# Patient Record
Sex: FEMALE | Race: WHITE
[De-identification: names, ages, dates, MRNs, and addresses within clinical notes are randomized per-mention and may not be internally consistent; named-entity substitution may affect disease eponyms.]

---

## 2018-02-25 ENCOUNTER — HOSPITAL ENCOUNTER (EMERGENCY)
Dept: HOSPITAL 25 - UCEAST | Age: 58
Discharge: HOME | End: 2018-02-25
Payer: COMMERCIAL

## 2018-02-25 VITALS — DIASTOLIC BLOOD PRESSURE: 82 MMHG | SYSTOLIC BLOOD PRESSURE: 116 MMHG

## 2018-02-25 DIAGNOSIS — R03.0: ICD-10-CM

## 2018-02-25 DIAGNOSIS — M79.662: Primary | ICD-10-CM

## 2018-02-25 DIAGNOSIS — Z88.5: ICD-10-CM

## 2018-02-25 DIAGNOSIS — Z88.0: ICD-10-CM

## 2018-02-25 DIAGNOSIS — M21.372: ICD-10-CM

## 2018-02-25 PROCEDURE — G0463 HOSPITAL OUTPT CLINIC VISIT: HCPCS

## 2018-02-25 PROCEDURE — 99211 OFF/OP EST MAY X REQ PHY/QHP: CPT

## 2018-02-25 NOTE — RAD
Indication: Left lower leg pain.



2 views of left lower leg demonstrates no fracture. No other bone or joint abnormality is

identified.



IMPRESSION: No definite fracture of the left lower leg is noted.

## 2018-02-25 NOTE — UC
May LOPEZ Jason, scribed for Chaka Plascencia MD on 02/25/18 at 1226 .





Lower Extremity/Ankle HPI





- HPI Summary


HPI Summary: 


This patient is a 57 year old F presenting to [facility] with a chief complaint of 

stabbing and throbbing lower left leg pain since 2.5 weeks ago. The patient 

states the leg pain got to the point where I cant run. It has interrupted my 

sleep for the last 3 days. Prior to the injury I was running 3 days a week 

because I got a hip injury in December. My hip got better, I received had a 

cortisone injection, and I began running 20 miles a week. Additionally, the 

patient states she couldnt even stand this morning and is taking ibuprofen for 

pain. The patient rates the pain 5/10 in severity. Symptoms aggravated by 

nothing. Symptoms alleviated by nothing. Patient reports having a foot drop. 

Patient denies weakness, numbness, and butt pain. Has hx of stress fracture.








- History of Current Complaint


Chief Complaint: UCLowerExtremity


Stated Complaint: LEG PAIN


Time Seen by Provider: 02/25/18 12:02


Hx Obtained From: Patient


Onset/Duration: Lasting Weeks - Since 2.5 weeks ago, Still Present


Pain Intensity: 5


Pain Scale Used: 0-10 Numeric


Aggravating Factor(s): Nothing


Alleviating Factor(s): Nothing





- Allergies/Home Medications


Allergies/Adverse Reactions: 


 Allergies











Allergy/AdvReac Type Severity Reaction Status Date / Time


 


codeine Allergy  Nausea Verified 02/25/18 10:50


 


Penicillins Allergy  Hives Verified 02/25/18 10:50











Home Medications: 


 Home Medications





Ibuprofen 400 mg PO 02/25/18 [History]











PMH/Surg Hx/FS Hx/Imm Hx





- Surgical History


Surgical History: None


Surgery Procedure, Year, and Place: AGE 12 MOUTH SURGERY-NO METAL.  AGE 38 

BUNIONECTOMY.  RIGHT BREAST BIOPSY





- Family History


Known Family History: Positive: Other - Breast CA





- Social History


Alcohol Use: Weekly


Alcohol Amount: 2-3 beers 1-3x a week


Substance Use Type: None


Smoking Status (MU): Never Smoked Tobacco





Review of Systems


Constitutional: Other - negative fever


Musculoskeletal: Other: - experienced a "foot drop" while running. Lower left 

leg pain.


All Other Systems Reviewed And Are Negative: Yes





Physical Exam





- Summary


Physical Exam Summary: 





General: well-appearing, no pain distress


Skin: warm, color reflects adequate perfusion, dry


Head: normal


Eyes: EOMI, SAMANTHA


ENT: normal


Neck: supple, nontender


Respiratory: CTA, breath sounds present


Cardiovascular: RRR


Abdomen: soft, nontender


Bowel: present


Musculoskeletal: No low back pain. No tenderness of sciatic nerve distribution. 

The knee is normal. Ankle is normal. Plantar flexion is normal. Left foot 

Dorsiflexion is mildly decreased when compared to the right foot.


Neurological: normal, sensory/motor intact, A&O x3


Psychological: affect/mood appropriate





Triage Information Reviewed: Yes


Vital Signs: 


 Initial Vital Signs











Temp  98.4 F   02/25/18 10:42


 


Pulse  65   02/25/18 10:42


 


Resp  18   02/25/18 10:42


 


BP  116/82   02/25/18 10:42


 


Pulse Ox  100   02/25/18 10:42














Diagnostics





- Radiology


  ** lower extremity xray


Radiology Interpretation Completed By: Radiologist - Lower extremity x-ray 

reveals, per radiologist, no definite fracture of the left lower leg.  

physician has reviewed this radiology report.





Lower Extremity Course/Dx





- Course


Course Of Treatment: Lower extremity x-ray reveals, per radiologist, No 

definite fracture of the left lower leg is noted.  THERE IS MILD DECREASED 

STRENGTH OF LEFT FOOT DORSIFLEXION ON EXAM. PATIENT REPORTS IT WAS WORSE 2 

WEEKS AGO WHERE SHE HAD DIFFICULTY WITH DORSIFLEXION.  THERE IS NO LOW BACK OR 

HIP/SCIATIC PAIN/TENDERNESS.  THE PAIN IN THE LOWER LEG MOST PROBABLY REPRESENT 

PERONEAL NERVE IMPINGEMENT FROM SOFT TISSUE INFLAMMATION.  DISCUSSED X-RAY 

RESULTS AND FOOT DROP.  TREAT WITH REST/ICE NSAIDS AND CLOSE F/U WITH SPORTS 

MEDICINE.





- Differential Dx/Diagnosis


Provider Diagnoses: LEFT LOWER LEG PAIN.  LEFT FOOT DROP.  Elevated blood 

pressure without a diagnosis of hypertension





Discharge





- Discharge Plan


Condition: Stable


Disposition: HOME


Patient Education Materials:  Foot Drop (ED)


Referrals: 


Eddie Szymanski [Medical Doctor] - 


Nazia Posada MD [Primary Care Provider] - 


Additional Instructions: 


FOLLOW UP WITH YOUR SPORTS MEDICINE DOCTOR.


CALL TOMORROW TO ARRANGE FOLLOW UP.


GET RECHECKED FOR ANY WORSENING OF YOUR CONDITION OR QUESTIONS OR CONCERNS.


YOUR BLOOD PRESSURE WAS ELEVATED DURING TODAY'S VISIT; FOLLOW UP WITH YOUR PCP 

WITHIN ONE WEEK FOR FURTHER EVALUATION.





The documentation as recorded by the May rodríguez Jason accurately 

reflects the service I personally performed and the decisions made by me, 

Chaka Plascencia MD.

## 2018-08-23 ENCOUNTER — HOSPITAL ENCOUNTER (EMERGENCY)
Dept: HOSPITAL 25 - ED | Age: 58
Discharge: ANOTHER HOSPITAL | End: 2018-08-23
Payer: COMMERCIAL

## 2018-08-23 VITALS — DIASTOLIC BLOOD PRESSURE: 65 MMHG | SYSTOLIC BLOOD PRESSURE: 98 MMHG

## 2018-08-23 DIAGNOSIS — Z88.5: ICD-10-CM

## 2018-08-23 DIAGNOSIS — B96.89: ICD-10-CM

## 2018-08-23 DIAGNOSIS — Z88.0: ICD-10-CM

## 2018-08-23 DIAGNOSIS — M46.46: ICD-10-CM

## 2018-08-23 DIAGNOSIS — L02.212: Primary | ICD-10-CM

## 2018-08-23 DIAGNOSIS — Z98.890: ICD-10-CM

## 2018-08-23 LAB
BASOPHILS # BLD AUTO: 0.1 10^3/UL (ref 0–0.2)
EOSINOPHIL # BLD AUTO: 0 10^3/UL (ref 0–0.6)
HCT VFR BLD AUTO: 32 % (ref 35–47)
HGB BLD-MCNC: 11.2 G/DL (ref 12–16)
INR PPP/BLD: 1.48 (ref 0.77–1.02)
LYMPHOCYTES # BLD AUTO: 3.2 10^3/UL (ref 1–4.8)
MCH RBC QN AUTO: 30 PG (ref 27–31)
MCHC RBC AUTO-ENTMCNC: 35 G/DL (ref 31–36)
MCV RBC AUTO: 86 FL (ref 80–97)
MONOCYTES # BLD AUTO: 0.5 10^3/UL (ref 0–0.8)
NEUTROPHILS # BLD AUTO: 15 10^3/UL (ref 1.5–7.7)
NRBC # BLD AUTO: 0 10^3/UL
NRBC BLD QL AUTO: 0.1
PLATELET # BLD AUTO: 490 10^3/UL (ref 150–450)
RBC # BLD AUTO: 3.78 10^6/UL (ref 4–5.4)
WBC # BLD AUTO: 18.9 10^3/UL (ref 3.5–10.8)

## 2018-08-23 PROCEDURE — 87205 SMEAR GRAM STAIN: CPT

## 2018-08-23 PROCEDURE — 86140 C-REACTIVE PROTEIN: CPT

## 2018-08-23 PROCEDURE — 72158 MRI LUMBAR SPINE W/O & W/DYE: CPT

## 2018-08-23 PROCEDURE — 36415 COLL VENOUS BLD VENIPUNCTURE: CPT

## 2018-08-23 PROCEDURE — 85025 COMPLETE CBC W/AUTO DIFF WBC: CPT

## 2018-08-23 PROCEDURE — 80053 COMPREHEN METABOLIC PANEL: CPT

## 2018-08-23 PROCEDURE — A9579 GAD-BASE MR CONTRAST NOS,1ML: HCPCS

## 2018-08-23 PROCEDURE — 96375 TX/PRO/DX INJ NEW DRUG ADDON: CPT

## 2018-08-23 PROCEDURE — 99284 EMERGENCY DEPT VISIT MOD MDM: CPT

## 2018-08-23 PROCEDURE — 85730 THROMBOPLASTIN TIME PARTIAL: CPT

## 2018-08-23 PROCEDURE — 96366 THER/PROPH/DIAG IV INF ADDON: CPT

## 2018-08-23 PROCEDURE — 87186 SC STD MICRODIL/AGAR DIL: CPT

## 2018-08-23 PROCEDURE — 87040 BLOOD CULTURE FOR BACTERIA: CPT

## 2018-08-23 PROCEDURE — 87077 CULTURE AEROBIC IDENTIFY: CPT

## 2018-08-23 PROCEDURE — 96376 TX/PRO/DX INJ SAME DRUG ADON: CPT

## 2018-08-23 PROCEDURE — 85610 PROTHROMBIN TIME: CPT

## 2018-08-23 PROCEDURE — 96365 THER/PROPH/DIAG IV INF INIT: CPT

## 2018-08-23 NOTE — ED
Progress





- Progress Note


Progress Note: 


This patient is a 58 year old F presenting to [facility] with a chief complaint of 

cramping right leg pain since 8/16/18, with sx at their worst since 8/19/18. 

She endorses lumbar spine laminectomy on 7/13/18 with Dr. Marin in [place]. 

She states that 10 days s/p surgery the incision site opened and became infected

; she finished her abx 8/16/18. Pt endorses that sx were nearly gone last week, 

but then returned and steadily worsened starting on 8/16/18. By 8/19/18 pt 

denies being able to ambulate, even with a crutch. Sx worsen with turning funny

; goes to 10/10 pain. Rx oxycodone and flexural, which stop alleviating sx 

after less than 1 hour. She endorses that the pain started out in her buttock, 

but is now located lower in the leg now. She denies current pain as long as she

s not moving. She endorses decreased ROM secondary to pain, right sided leg 

weakness, and partial numbness in her foot. Pt notes sleeping in a recliner 

chair, and endorses possibly laying on symptomatic side more frequently. Dr. Rosa planning on MRI Friday in [place].





Physical Exam:


General: well-appearing, no pain distress


Skin: warm, color reflects adequate perfusion, dry. 4 cm incision over lower 

lumbar spine with dressing in it, with a small amount of yellow discharge on 

the dressing itself. No erythema, no swelling


Head: normal


Eyes: EOMI, SAMANTHA


ENT: normal


Neck: supple, nontender


Respiratory: CTA, breath sounds present


Cardiovascular: RRR


Abdomen: soft, nontender


Bowel: present


Musculoskeletal: Any right leg movement causes lower back pain. Patellar 

reflexes intact, normal pulses, plantar and dorsal flexion 5/5 bilaterally, 

though pt reports right dorsal flexion is weaker than the left. Lowered 

sensation of medial aspect of right foot, normal on lateral aspect.


Neurological: sensory/motor intact, A&O x3


Psychological: affect/mood appropriate





- Results/Orders


Results/Orders: 


MRI Lumbar Spine:


1.  TRANSITIONAL ANATOMY, USING THE COUNTING SCHEME AS DESCRIBED ABOVE.


2.  STATUS POST LEFT HEMILAMINECTOMY AT L3-L4.


3.  THERE HAS BEEN INTERVAL DEVELOPMENT OF BONE EDEMA AND ENHANCEMENT OF L4-L5 

WITH


IRREGULAR ENDPLATES AND FLUID WITHIN THE INTERVERTEBRAL DISC SPACE OF L4-L5 

CONSISTENT


WITH OSTEOMYELITIS DISCITIS. THERE IS ASSOCIATED PARAVERTEBRAL INFLAMMATORY 

CHANGE


4.  THERE IS A PERIPHERALLY ENHANCING EPIDURAL FLUID COLLECTION AT L4 EXTENDING 

TO THE


RIGHT LATERAL RECESS OF L4-L5 MOST CONSISTENT WITH EPIDURAL ABSCESS GIVEN THE 

IMAGING


FINDINGS.


5.  THERE IS SEVERE NARROWING OF THE CENTRAL CANAL AT L4-L5. THERE IS 

NEUROFORAMINAL


NARROWING AS DESCRIBED ABOVE. 


Dr. Plascencia has reviewed this report.








Re-Evaluation





- Re-Evaluation


  ** First Eval


Re-Evaluation Time: 14:14


Change: Unchanged


Comment: Informed of transfer





Course/Dx





- Course


Course Of Treatment: DISCUSSED WITH THE PATIENT'S NEUROSURGEON, DR MARIN [phone].  HE RECOMMENDED TRANSFER TO [facility] WHERE HE HAS ADMITTING 

PRIVLEDGES.  STABLE AT TRANSFER.





- Diagnoses


Provider Diagnoses: 


 Back pain, Abscess in epidural space of lumbar spine, Lumbar discitis








- Provider Notifications


Discussed Care Of Patient With: Kamran Marin MD


Time Discussed With Above Provider: 08:35


Instructed by Provider To: Other - Phone number [phone]. Recommends MRI of 

L-S spine.





Discharge





- Sign-Out/Discharge


Documenting (check all that apply): Patient Departure - transfer, Receiving Sign

-Out


Receiving patient FROM: [facility]





- Discharge Plan


Condition: Stable


Disposition: TRANS HIGHER LVL OF CARE FAC


Referrals: 


Nazia Posada MD [Primary Care Provider] - 





- Billing Disposition and Condition


Condition: STABLE


Disposition: Trans Higher Lvl of Care Fac





- Attestation Statements


Document Initiated by Imeldaibe: Yes


Documenting Scribe: Eriberto Rodriguez


Provider For Whom Scribe is Documenting (Include Credential): Dr. Chaka Plascencia MD


Scribe Attestation: 


Eriberto LOPEZ scribed for Dr. Chaka Plascencia MD on 08/23/18 at 1523. 


Scribe Documentation Reviewed: Yes


Provider Attestation: 


The documentation as recorded by the Eriberto rodríguez accurately 

reflects the service I personally performed and the decisions made by me, Dr. Chaka Plascencia MD

## 2018-08-23 NOTE — RAD
HISTORY: low back/rt leg pain,lumbar laminectomy 6 wk ago



COMPARISONS: February 27, 2018 



TECHNIQUE: The following sequences were obtained of the lumbar spine: Sagittal and axial

T1- and T2-weighted images, coronal T2-weighted images, and sagittal STIR images.

Additionally, axial and sagittal T1 weighted images were obtained after contrast

enhancement with a gadolinium-based intravenous contrast agent..



FINDINGS: There is transitional anatomy with a last lumbar type vertebral body which will

be labeled L5 for the purposes of counting.



SPINAL CORD, CONUS, AND CAUDA EQUINA: The visualized spinal cord, conus, and cauda equina

are normal in caliber, position, and signal intensity. There is a peripherally enhancing

epidural fluid collection measuring 0.8 cm in depth centered at L4 extending to the right

lateral recess of L4-L5. 

ALIGNMENT: There is grade 1 anterolisthesis of L4-L5. 

VERTEBRAL BODIES: There is edema and the patient is status post laminectomy. Enhancement

of the vertebral bodies of L4 and L5 with irregularity along the endplates.  There is

transitional anatomy with partial sacralization of the L5 vertebral body.

JOINTS: There is facet osteoarthritis along the lower lumbar spine. 

MUSCULATURE: There is edema and enhancement of the left multifidus muscle. 

INTERVERTEBRAL DISCS: There is intravertebral fluid at L4-L5. 



AXIAL IMAGES:



L2-L3: There is no disc herniation, spinal stenosis, or neuroforaminal narrowing.

L3-L4: A left-sided laminectomy defect is noted. There is moderate bilateral

neuroforaminal narrowing. 

L4-L5: There is a broad-based disc bulge/rolled disc. There is post surgical change to

left facet joint. There is ligamentous hypertrophy. There is moderate bilateral

neuroforaminal narrowing. There is severe narrowing of the central canal. 

L5-S1: There is bilateral facet hypertrophy. There is no significant neural foraminal

narrowing of central canal stenosis. 



SOFT TISSUES: There is post surgical change posterior to L4-L5 with fluid tracking to the

skin surface. There is perivertebral edema and enhancement centered at L4-L5. 



OTHER: None.



IMPRESSION:

1.  TRANSITIONAL ANATOMY, USING THE COUNTING SCHEME AS DESCRIBED ABOVE.

2.  STATUS POST LEFT HEMILAMINECTOMY AT L3-L4.

3.  THERE HAS BEEN INTERVAL DEVELOPMENT OF BONE EDEMA AND ENHANCEMENT OF L4-L5 WITH

IRREGULAR ENDPLATES AND FLUID WITHIN THE INTERVERTEBRAL DISC SPACE OF L4-L5 CONSISTENT

WITH OSTEOMYELITIS DISCITIS. THERE IS ASSOCIATED PARAVERTEBRAL INFLAMMATORY CHANGE

4.  THERE IS A PERIPHERALLY ENHANCING EPIDURAL FLUID COLLECTION AT L4 EXTENDING TO THE

RIGHT LATERAL RECESS OF L4-L5 MOST CONSISTENT WITH EPIDURAL ABSCESS GIVEN THE IMAGING

FINDINGS.

5.  THERE IS SEVERE NARROWING OF THE CENTRAL CANAL AT L4-L5. THERE IS NEUROFORAMINAL

NARROWING AS DESCRIBED ABOVE. 



PRELIMINARY FINDINGS WERE DISCUSSED WITH DR. BENSON AT APPROXIMATELY 12:23 PM ON AUGUST 23, 2018 .

## 2018-08-23 NOTE — ED
Lower Extremity





- HPI Summary


HPI Summary: 


Pt is a 58 year old female presenting to the ED with a chief complaint of leg 

pain. About 6 weeks ago, she had a laminectomy on her lumbar spine at [facility], but had complications when the sutures opened and the wound 

became infected. She was on an Abx course which she finished, but started 

having pain in her R leg on 8/18. The next day, the spasms started, but she 

could still walk to the bathroom. Currently, she cannot bear weight or move her 

R leg without any pain. It feels ok when she is resting, but when she tries to 

move, she describes it as a 10/10 pain. Pt denies fevers. 








- History of Current Complaint


Chief Complaint: EDAbdPain


Stated Complaint: LEG PAIN


Time Seen by Provider: 08/23/18 05:47


Hx Obtained From: Patient


Mechanism Of Injury: Other - surgery 6 wks ago


Onset of Pain: Days


Onset/Duration: Days


Severity Initially: Moderate


Severity Currently: Moderate


Pain Intensity: 10


Pain Scale Used: 0-10 Numeric - upon movement


Timing: Constant


Associated Signs And Symptoms: Negative: Fever


Aggravating Factor(s): Standing, Ambulation, Movement, Weight Bearing


Alleviating Factor(s): Rest





- Allergies/Home Medications


Allergies/Adverse Reactions: 


 Allergies











Allergy/AdvReac Type Severity Reaction Status Date / Time


 


codeine Allergy  Nausea Verified 05/04/18 10:05


 


Penicillins Allergy  Hives Verified 05/04/18 10:05














PMH/Surg Hx/FS Hx/Imm Hx


Previously Healthy: Yes


Endocrine/Hematology History: 


   Denies: Hx Diabetes


Cardiovascular History: 


   Denies: Hx Hypertension, Hx Pacemaker/ICD


 History: 


   Denies: Hx Renal Disease


Musculoskeletal History: 


   Denies: Hx Rheumatoid Arthritis, Hx Osteoporosis


Sensory History: 


   Denies: Hx Hearing Aid


Neurological History: 


   Denies: Other Neuro Impairments/Disorders - PAIN CLINIC PT


Psychiatric History: 


   Denies: Hx Panic Disorder





- Cancer History


Hx Chemotherapy: No


Hx Radiation Therapy: No





- Surgical History


Surgery Procedure, Year, and Place: AGE 12 MOUTH SURGERY-NO METAL.  AGE 38 

BUNIONECTOMY.  RIGHT BREAST BIOPSY


Infectious Disease History: 


   Denies: Traveled Outside the [place] in Last 30 Days





- Family History


Known Family History: Positive: Other - Breast CA





- Social History


Alcohol Use: Weekly


Alcohol Amount: 3-5 DRINKS


Substance Use Type: Reports: None


Hx Tobacco Use: No


Smoking Status (MU): Never Smoked Tobacco





Review of Systems





- ROS Summary


Review of Systems Summary: 


Appearance: Well-appearing, Well-nourished, lying in bed comfortable


Skin: Warm, dry, no obvious rash


Eyes: sclera anicteric, no conjunctival pallor


ENT: mucous membranes moist


Neck: deferred


Respiratory: No signs of respiratory distress


Cardiovascular: Appears well perfused, pulses are nml


Abdomen: deferred


Musculoskeletal: Good strength and ROM in both legs, reflexes are fine in L leg

, no reflexes on R leg. Wound on back from laminectomy has mild purulent 

drainage. The area has no swelling, tenderness, or erythema around the area. 


Neurological: Awake  and alert, mentation is normal, speech is fluent and 

appropriate


Psychiatric: affect is normal, does not appear anxious or depressed








Negative: Fever


Musculoskeletal: Other - spasms in R leg


Positive: Myalgia, Other - reflexes are fine in L leg, no reflexes on R leg. 

Wound on back from laminectomy has mild purulent drainage. The area has no 

swelling, tenderness, or erythema around the area. 


All Other Systems Reviewed And Are Negative: Yes





Physical Exam





- Summary


Physical Exam Summary: 


Appearance: Well-appearing, Well-nourished, lying in bed comfortable


Skin: Warm, dry, no obvious rash


Eyes: sclera anicteric, no conjunctival pallor


ENT: mucous membranes moist


Neck: deferred


Respiratory: No signs of respiratory distress


Cardiovascular: Appears well perfused, pulses are nml


Abdomen: deferred


Musculoskeletal: Good strength and ROM in both legs, reflexes are fine in L leg

, no reflexes on R leg. Wound on back from laminectomy has mild purulent 

drainage. The area has no swelling, tenderness, or erythema around the area. 


Neurological: Awake  and alert, mentation is normal, speech is fluent and 

appropriate


Psychiatric: affect is normal, does not appear anxious or depressed








Triage Information Reviewed: Yes


Vital Signs Reviewed: Yes





Lower Extremity Course/Dx





- Diagnoses


Provider Diagnoses: 


 Back pain








Discharge





- Sign-Out/Discharge


Documenting (check all that apply): Sign-Out Patient


Signing out patient TO: Chaka Plascencia


Receiving patient FROM: [facility]





- Discharge Plan


Referrals: 


Nazia Posada MD [Primary Care Provider] - 





- Attestation Statements


Document Initiated by Scribe: Yes


Documenting Scribe: Brigid Song


Provider For Whom Scribe is Documenting (Include Credential): [facility] MD.


Scribe Attestation: 


Brigid LOPEZ, scribed for [facility] MD. on 08/23/18 at 0641.

## 2018-08-23 NOTE — XMS REPORT
Coco Bonner

 Created on:2018



Patient:Coco Bonner

Sex:Female

:1960

External Reference #:[id number]





Demographics







 Address  [address] [place] [address]

 

 Home Phone  [phone]

 

 Work Phone  [phone]

 

 Email Address  [email or web address]

 

 Preferred Language  English

 

 Marital Status  Not  Or 

 

 Mandaeism Affiliation  Unknown

 

 Race  White

 

 Ethnic Group  Not  Or 









Author







 Organization  [facility]

 

 Address  [address]



   [place] [address]

 

 Phone  [phone]









Care Team Providers







 Name  Role  Phone

 

 Nazia Willoughby MD  Care Team Information   Unavailable









Payers







 Type  Date  Identification Numbers  Payment Provider  Subscriber

 

 Commercial    Policy Number: [id number]  Lifetime Benefit SLNS  Coco Bonner









 Group Number: [id number]  [address] [address]

 

 PayID: [facility]  [place] [id number]









 Commercial  Expires: 2014  Policy Number:  [facility] DO Not Use  Coco Bonner



     [id number]    









 PayID: [employer]  [address]









 [place] [address]







Problems







 Date  Description  Provider  Status

 

 Onset: 2015  Environmental allergy  Anamaria Davis MD  Active







Family History







 Date  Family Member(s)  Problem(s)  Comments

 

   Father   due to Hodgkin  ()



     Lymphoma  

 

   Father   due to Congestive  ()



     Heart Failure  

 

   Father   due to CAD  () - father MI 64's,



       totally 3 MI's mat



       grandmoter Mi in 60's

 

   Mother   due to Mesothelioma  ()

 

   Paternal Grandfather   due to Heart Attack  ()

 

   Paternal Grandmother   due to rheumatoid  ()



     arthritus  

 

   Maternal Grandfather   due to Heart Attack  ()

 

   Maternal Grandmother   due to Old Age  ()

 

   Maternal Grandmother   due to breast cancer  () -







Social History







 Type  Date  Description  Comments

 

 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  



     Director  

 

 ETOH Use    Occasionally consumes beer  

 

 Smoking    Patient has never smoked  

 

 Recreational Drug Use    Never Used Drugs  

 

 Daily Caffeine    occasional 1-2 month  1 q week

 

 Enjoy Exercising    Enjoys Exercising  

 

 Seat Belt/Car Seat    always uses seat belt  

 

 Sexual Hx text      Female partner







Allergies, Adverse Reactions, Alerts







 Date  Description  Reaction  Status  Severity  Comments

 

 2015  Penicillins    active    

 

 2015  Pollen    active    

 

 2015  Grass    active    







Medications







 Medication  Date  Status  Form  Strength  Qnty  SIG  Indications  Ordering



                 Provider

 

 Betamethasone  /  Active  Ointment  0.05%  45gm  apply to  L23.7  Case,



 Dipropionate            affected    Nazia



             skin in    MD BANDAR



             morning and    



             night    

 

 Xyzal Allergy  00/  Active  Tablets  5mg    OTC Per  V15.09  Unknown



 24HR  0000          Allergist    









 J30.9









 Oxycodone-Acetaminophen    Active  Tablets  5-325mg    1 tabs at    
Unknown



             up to 2    



             times a day    



             ( every 6    



             hours) as    



             needed for    



             severe pain    

 

                 

 

 Gabapentin  2018 -  Hx  Capsules    9  2 PO Q Am  M  [facility],



   2018        0  And 3 PO AT  5  Nazia



           c  hs  4  MD BANDAR



           a    .  



           p    1  



           s    6  

 

 Clarithromycin  2016 -  Hx  Tablets  500mg  1  1 tablet  J  [facility],



   2017        4  per oral  0  Nazia



           t  every 12  2  MD kellie PORTILLO  hours x 7  .  



           b  days  0  



           s      

 

 Benzonatate  2016 -  Hx  Capsules  100mg  3  1 capsule 3  J  [facility],



   2017        0  x time  0  Nazia



           c  daily as  2  MD kellie PORTILLO  needed for  .  



           p  cough.  0  



           s      

 

 Fluticasone Propionate  2016 -  Hx  Suspension  50mcg/Act  1  spray one  
J  [facility],



   2017        6  spray in  0  Nazia



           u  each  2  MD aurelia PORTILLO  nostril  .  



           i  every day  0  



           t      



           s      

 

 Azithromycin  2016 -  Hx  Tablets  250mg  6  2 tabs on    [facility],



   10/13/2016        t  day 1. one    Nazia



           a  tab by    MD inder PORTILLO  mouth 2-5    



           s      

 

 Nasonex  2015 -  Hx  Suspension  50mcg/Act  1  intranasal    Ryan,



   10/13/2016        u  2 puffs b/l    Anamaria



           n  nostrils    MD Omaira



           i  everyday    



           t      



           s      

 

 Alavert Allergy/Sinus  2015 -  Hx  Tablets ER  5-120mg      V  Ryan,



   2017    12HR        1  Anamaria



               5  MD Omaira



               .  



               0  



               9  

 

 Montelukast Sodium  2015 -  Hx  Tablets  10mg  9  1 by mouth    Ryan,



   2016        0  every day -    Anamaria



           t  free    MD Omaira



           a  samples    



           b      



           s      

 

 Triamcinolone Acetonide  2015 -  Hx  Aerosol  55mcg/Act  1  1 sprays bl 
   Ryan,



   2015        u  nostrils    Anamaria



           n  everyday    MD Omaira



           i  prn - free    



           t  sample    



           s      

 

 Fluticasone Propionate  2015 -  Hx  Suspension  50mcg/Act  1  1 spray b/
l    Ryan,



   2015        u  nostrils    Anamaria



           n  once to    MD Omaira



           i  twice a day    



           t      



           s      







Immunizations







 CPT Code  Status  Date  Vaccine  Lot #

 

 [id number]  Given  2016  Influenza Vac, 3 Yrs & Older, Quadrivalent, Split,  



       Im Use  







Vital Signs







 Date  Vital  Result  Comment

 

 2018  Weight  124.00 lb  









 Heart Rate  72 /min  

 

 BP Systolic Sitting  111 mmHg  

 

 BP Diastolic Sitting  80 mmHg  

 

 Height  62 inches  5'2"

 

 BMI (Body Mass Index)  22.7 kg/m2  

 

 Right Visual Acuity Distance  20/15  

 

 Left Visual Acuity Distance  20/13  









 2018  Weight  125.00 lb  









 Heart Rate  61 /min  

 

 BP Systolic  106 mmHg  

 

 BP Diastolic  74 mmHg  

 

 Height  62 inches  5'2"

 

 BMI (Body Mass Index)  22.9 kg/m2  

 

 Urine Dipstick - Blood  NEGATIVE  

 

 Urine Dipstick - Protein  NEGATIVE  

 

 Urine Dipstick - Glucose  NEGATIVE  

 

 Urine Dipstick - Leukocytes  NEGATIVE  









 2017  Weight  126.00 lb  









 Heart Rate  65 /min  

 

 BP Systolic  105 mmHg  

 

 BP Diastolic  73 mmHg  

 

 Height  62 inches  5'2"

 

 BMI (Body Mass Index)  23.0 kg/m2  









 2017  Weight  124.12 lb  









 Heart Rate  52 /min  

 

 BP Systolic  102 mmHg  

 

 BP Diastolic  72 mmHg  

 

 Height  62 inches  5'2"

 

 BMI (Body Mass Index)  22.7 kg/m2  









 2016  Body Temperature  97.8 F  









 Weight  125.38 lb  

 

 Heart Rate  75 /min  

 

 BP Systolic  101 mmHg  

 

 BP Diastolic  74 mmHg  

 

 Height  62.5 inches  5'2.50"

 

 BMI (Body Mass Index)  22.6 kg/m2  









 10/13/2016  Weight  129.25 lb  









 Heart Rate  58 /min  

 

 BP Systolic  117 mmHg  

 

 BP Diastolic  83 mmHg  

 

 Height  62.5 inches  5'2.50"

 

 BMI (Body Mass Index)  23.3 kg/m2  









 2016  Weight  125.00 lb  









 Heart Rate  68 /min  

 

 BP Systolic  102 mmHg  

 

 BP Diastolic  70 mmHg  

 

 Height  62.5 inches  5'2.50"

 

 BMI (Body Mass Index)  22.5 kg/m2  









 2016  Body Temperature  98.6 F  









 Weight  127.50 lb  

 

 Heart Rate  62 /min  

 

 BP Systolic  109 mmHg  

 

 BP Diastolic  69 mmHg  

 

 Height  62.5 inches  5'2.50"

 

 BMI (Body Mass Index)  22.9 kg/m2  









 2015  Weight  128.00 lb  









 Heart Rate  63 /min  

 

 BP Systolic  102 mmHg  

 

 BP Diastolic  67 mmHg  

 

 Height  62.5 inches  5'2.50"

 

 BMI (Body Mass Index)  23.0 kg/m2  









 2015  Weight  126.12 lb  









 Heart Rate  60 /min  

 

 BP Systolic  115 mmHg  

 

 BP Diastolic  76 mmHg  

 

 Height  62.5 inches  5'2.50"

 

 BMI (Body Mass Index)  22.7 kg/m2  







Results







 Test  Date  Test  Result  H/L  Range  Note

 

 Xray  2018  Mammogram, Screening,  <pending>      



     Bilateral        

 

 CBC with Auto  2018  WBC  9.5 K/uL    4.1-11.0  



 Diff-fcmg            









 RBC  4.30 M/uL    4.00-5.40  

 

 Hemoglobin  13.3 gm/dL    12.0-16.0  

 

 Hematocrit  39.3 %    36.0-47.0  

 

 MCV  91.2 fL    80.0-97.0  

 

 MCH  31.0 pg    27.0-32.0  

 

 MCHC  33.9 g/dL    32.0-36.0  

 

 RDW  13.2 %    11.5-14.5  

 

 PLT Count  250 K/ul    140-400  

 

 MPV  10.0 FL    7.1-10.7  

 

 Neutrophil  58.2 %    35.0-75.0  

 

 Lymphocyte  34.7 %    16.0-52.0  

 

 Monocyte  5.5 %    2.0-10.0  

 

 Eosinophil  0.7 %    0.0-5.0  

 

 Basophil  0.9 %    0.0-4.0  

 

 Abs Neutrophils  5.5 K/uL    2.1-8.0  

 

 Abs Lymphocytes  3.3 K/uL    0.8-5.5  

 

 Abs Monocytes  0.5 K/uL    0.1-1.0  

 

 Abs Eosinophils  0.1 K/uL    0.0-0.5  

 

 Abs Basophils  0.1 K/uL    0.0-0.3  









 Comprehensive Met Panel-[facility]  2018  Sodium  142 mmol/L    135-146  1









 Potassium  4.8 mmol/L    3.5-5.2  

 

 Chloride#  104 mmol/L      2

 

 Carbon Dioxide  28 mmol/L    24-34  

 

 Glucose  108 mg/dL  High    

 

 BUN  20 mg/dL    6-26  

 

 Creatinine  0.9 mg/dL    0.5-1.4  

 

 Calcium  10.0 mg/dL    8.5-10.2  

 

 Total Protein  6.6 g/dL    6.0-8.0  

 

 Albumin  4.6 g/dL    3.6-4.9  

 

 Globulin  2.0 g/dL    2.0-3.5  

 

 A/G Ratio  2.3 Ratio  High  1.0-2.2  

 

 Total Bilirubin  0.7 mg/dL    0.1-1.3  

 

 Alkaline Phosphatase  54 U/L      

 

 Alt  25 U/L    3-42  

 

 Ast  39 U/L    8-42  

 

  Sonja Egfr  >60    >60  3

 

 Non  Sonja Egfr  >60    >60  4

 

 Anion Gap  10 mmol/L    5-15  5









 CBC With Auto Diff  07/10/2017  WBC  9.3 K/uL    4.1-11.0  









 RBC  4.34 M/uL    4.00-5.40  

 

 Hemoglobin  12.7 gm/dL    12.0-16.0  

 

 Hematocrit  38.6 %    36.0-47.0  

 

 MCV  88.9 fL    80.0-97.0  

 

 MCH  29.3 pg    27.0-32.0  

 

 MCHC  33.0 g/dL    32.0-36.0  

 

 RDW  14.6 %  High  11.5-14.5  

 

 PLT Count  212 K/ul    140-400  

 

 Neutrophil  64.1 %    35.0-75.0  

 

 Lymphocyte  30.6 %    16.0-52.0  

 

 Monocyte  3.7 %    2.0-10.0  

 

 Eosinophil  1.1 %    0.0-5.0  

 

 Basophil  0.5 %    0.0-4.0  

 

 Abs Neutrophils  6.0 K/uL    2.1-8.0  

 

 Abs Lymphocytes  2.8 K/uL    0.8-5.5  

 

 Abs Monocytes  0.3 K/uL    0.1-1.0  

 

 Abs Eosinophils  0.1 K/uL    0.0-0.5  

 

 Abs Basophils  0.0 K/uL    0.0-0.3  









 Lipid  07/10/2017  Cholesterol  224 mg/dL  High    









 Triglycerides  99 mg/dL      

 

 HDL  79 mg/dL    35-85  6

 

 Chol/ HDL Ratio  2.9 ratio  Low  3.7-5.6  

 

 VLDL  20 mg/dL    2-29  

 

 LDL (Calc)  126 mg/dL  High  20-99  7









 Laboratory test finding  07/10/2017  TSH  0.67 uIU/mL    0.35-4.94  









 Vitamin B12  247 pg/mL    180-914  

 

 Vit D,25 Hydroxy  31 ng/mL      

 

 Hepatitis C Virus Antibody  NONREACTIVE    Nonreactive  









 Comprehensive Met Panel-FCM  07/10/2017  Sodium  142 mmol/L    135-146  8









 Potassium  5.8 No visible h <SEE NOTE> mmol/L  High  3.5-5.2  9

 

 Chloride#  107 mmol/L      10

 

 Carbon Dioxide  28 mmol/L    24-34  

 

 Glucose  103 mg/dL      

 

 BUN  18 mg/dL    6-26  

 

 Creatinine  1.0 mg/dL    0.5-1.4  

 

 Calcium  9.7 mg/dL    8.5-10.2  

 

 Total Protein  6.5 g/dL    6.0-8.0  

 

 Albumin  4.2 g/dL    3.6-4.9  

 

 Globulin  2.3 g/dL    2.0-3.5  

 

 A/G Ratio  1.8 Ratio    1.0-2.2  

 

 Total Bilirubin  0.8 mg/dL    0.1-1.3  

 

 Alkaline Phosphatase  68 U/L      

 

 Alt  24 U/L    3-42  

 

 Ast  37 U/L    8-42  

 

  Sonja Egfr  >60    >60  11

 

 Non  Sonja Egfr  60  Low  >60  12

 

 Anion Gap  13 mmol/L    7-16  13









 1  Updated reference range on new analyzer 3-2017

 

 2  Updated reference range on new analyzer 3-2017

 

 3  Concerning GFR Guidelines for  Americans:



   Normal function or mild renal disease, if clinically at risk:  >/=60



   mL/min



   Moderately decreased:  30-59



   Severely decreased:  15-29



   Renal failure:  <15

 

 4  Concerning GFR Guidelines:



   Normal function or mild renal disease, if clinically at risk:  >/=60



   mL/min



   Moderately decreased:  30-59



   Severely decreased:  15-29



   Renal failure:  <15



   



   Glomerular Filtration Rate (GFR) is estimated based on the MDRD



   equation, which assumes a steady state for creatinine as recommended



   by the National Kidney Disease Education Program in conjunction with



   the National Institutes of Health and the National Kidney Foundation.



   



   Clinical conditions in which it may be necessary to measure GFR by



   using clearance methods include extremes of age and body size, severe



   malnutrition or obesity, diseases of skeletal muscle, paraplegia or



   quadriplegia, vegetarian diet, rapidly changing kidney function, and



   calculation of the dose of potentially toxic drugs that are excreted



   by the kidneys.

 

 5  Updated Reference Range 

 

 6  Per NCEP ATP III Guidelines:



   Results lower than 40 mg/dL are suggestive of increased risk for



   coronary artery disease.  Results > or=to 60 mg/dL are considered a



   negative risk factor.

 

 7  Per NCEP ATP III Guidelines:



   Normal Population <130



   Patients with medical conditions: CHD/DM



   Optimal: <100



   Borderline high: 130-159



   High: 160-189



   Very high: >189

 

 8  Updated reference range on new analyzer 3-2017

 

 9  5.8 No visible hemolysis.

 

 10  Updated reference range on new analyzer 3-2017

 

 11  Concerning GFR Guidelines for  Americans:



   Normal function or mild renal disease, if clinically at risk:  >/=60



   mL/min



   Moderately decreased:  30-59



   Severely decreased:  15-29



   Renal failure:  <15

 

 12  Concerning GFR Guidelines:



   Normal function or mild renal disease, if clinically at risk:  >/=60



   mL/min



   Moderately decreased:  30-59



   Severely decreased:  15-29



   Renal failure:  <15



   



   Glomerular Filtration Rate (GFR) is estimated based on the MDRD



   equation, which assumes a steady state for creatinine as recommended



   by the National Kidney Disease Education Program in conjunction with



   the National Institutes of Health and the National Kidney Foundation.



   



   Clinical conditions in which it may be necessary to measure GFR by



   using clearance methods include extremes of age and body size, severe



   malnutrition or obesity, diseases of skeletal muscle, paraplegia or



   quadriplegia, vegetarian diet, rapidly changing kidney function, and



   calculation of the dose of potentially toxic drugs that are excreted



   by the kidneys.

 

 13  Updated reference range on new analyzer 3-2017







Procedures







 Date  CPT Code  Description  Status

 

 2018  89066  Electrocardiogram Complete  Completed

 

 2017    Mammogram  Completed

 

 2016  63565  Electrocardiogram Complete  Completed

 

 2015    Mammogram  Completed

 

 2015  41914  Electrocardiogram Complete  Completed

 

 2013    Mammogram  Completed

 

 2012    Mammogram  Completed







Encounters







 Type  Date  Location  Provider  CPT E/M  Dx

 

 Office Visit  2018  9:40a  Yousuf Copeland [place]  [address]  Z01.818









 M54.16

 

 J30.9









 Office Visit  2017  2:20p  Yousuf Copeland PA  [address]  L23.7

 

 Office Visit  2017  2:00p  Fauzia Blackman RN MS FNP  [address]  
Z00.00









 J30.9

 

 Z12.31









 Office Visit  2016  1:40p  Chioma Luis PA  [address]  J02.0

 

 Office Visit  10/13/2016 11:20a  Chioma Luis PA  [address]  R55

 

 Office Visit  2016  1:40p  Finn River PA  [address]  J02.9









 R68.84

 

 J01.90









 Office Visit  2015  2:40p  Anamaria Alvarado MD  [address]  995.3









 V81.0

 

 V72.31









 Office Visit  2015  2:40p  Anamaria Alvarado MD  8[address]  V70.0









 995.3







Plan of Care

2018 - Fauzia Hernandez RN MS FNPZ00.00 Encntr for general adult 
medical exam w/o abnormal findingsMiscellaneous:DISCUSSED IMPORTANCE OF SUN 
SCREEN WHEN OUT DOORS.Z12.31 Encntr screen mammogram for malignant neoplasm of 
tvsutcE20.9 Vitamin D deficiency, unspecifiedMiscellaneous:Vitamin d: take 
1000iu daily in summer and 2000iu daily in winter, this may help with 
depression andor mood.D51.9 Vitamin B12 deficiency anemia, 
unspecifiedMiscellaneous:MAY TAKE VIT B12 [id number] Disruption of 
external operation (surgical) wound, NEC, initComments:DR MARIN, [phone] 
PHONE PLEASE FAX THE CULTURE FHRSTGS23.25 Body mass index (BMI) 25.0-25.9, 
adultAllMiscellaneous:CHECK WITH YOUR PCP,  GET YOUR LAST TETNUS SHOT INFO AND 
YOUR LAST COLONOSCOPY.  FAX TO OUR OFFICE

## 2018-08-25 NOTE — PN
Progress Note





- Progress Note


Date of Service: 08/23/18


Note: 


Pt. seen in ER 8/23 for post op wound infection. Pt. was transferred at [facility] where is surgeon is. BC today growing serratia marcescens. Will fax 

susceptibility when available.

## 2018-08-28 NOTE — ED
Progress





- Progress Note


Progress Note: 


This patient is a 58 year old F presenting to [facility] with a chief complaint of 

cramping right leg pain since 8/16/18, with sx at their worst since 8/19/18. 

She endorses lumbar spine laminectomy on 7/13/18 with Dr. Marin in [place]. 

She states that 10 days s/p surgery the incision site opened and became infected

; she finished her abx 8/16/18. Pt endorses that sx were nearly gone last week, 

but then returned and steadily worsened starting on 8/16/18. By 8/19/18 pt 

denies being able to ambulate, even with a crutch. Sx worsen with turning funny

; goes to 10/10 pain. Rx oxycodone and flexural, which stop alleviating sx 

after less than 1 hour. She endorses that the pain started out in her buttock, 

but is now located lower in the leg now. She denies current pain as long as she

s not moving. She endorses decreased ROM secondary to pain, right sided leg 

weakness, and partial numbness in her foot. Pt notes sleeping in a recliner 

chair, and endorses possibly laying on symptomatic side more frequently. Dr. Rosa planning on MRI Friday in [place].





Physical Exam:


General: well-appearing, no pain distress


Skin: warm, color reflects adequate perfusion, dry. 4 cm incision over lower 

lumbar spine with dressing in it, with a small amount of yellow discharge on 

the dressing itself. No erythema, no swelling


Head: normal


Eyes: EOMI, SAMANTHA


ENT: normal


Neck: supple, nontender


Respiratory: CTA, breath sounds present


Cardiovascular: RRR


Abdomen: soft, nontender


Bowel: present


Musculoskeletal: Any right leg movement causes lower back pain. Patellar 

reflexes intact, normal pulses, plantar and dorsal flexion 5/5 bilaterally, 

though pt reports right dorsal flexion is weaker than the left. Lowered 

sensation of medial aspect of right foot, normal on lateral aspect.


Neurological: sensory/motor intact, A&O x3


Psychological: affect/mood appropriate





- Results/Orders


Results/Orders: 


MRI Lumbar Spine:


1.  TRANSITIONAL ANATOMY, USING THE COUNTING SCHEME AS DESCRIBED ABOVE.


2.  STATUS POST LEFT HEMILAMINECTOMY AT L3-L4.


3.  THERE HAS BEEN INTERVAL DEVELOPMENT OF BONE EDEMA AND ENHANCEMENT OF L4-L5 

WITH


IRREGULAR ENDPLATES AND FLUID WITHIN THE INTERVERTEBRAL DISC SPACE OF L4-L5 

CONSISTENT


WITH OSTEOMYELITIS DISCITIS. THERE IS ASSOCIATED PARAVERTEBRAL INFLAMMATORY 

CHANGE


4.  THERE IS A PERIPHERALLY ENHANCING EPIDURAL FLUID COLLECTION AT L4 EXTENDING 

TO THE


RIGHT LATERAL RECESS OF L4-L5 MOST CONSISTENT WITH EPIDURAL ABSCESS GIVEN THE 

IMAGING


FINDINGS.


5.  THERE IS SEVERE NARROWING OF THE CENTRAL CANAL AT L4-L5. THERE IS 

NEUROFORAMINAL


NARROWING AS DESCRIBED ABOVE. 


Dr. Plascencia has reviewed this report.

















UPDATE: spoke w/ Dr. Marin re: pt's cx findings. He reports he was already of 

the serratia marscens - he performed surgical repair of herniation s/p initial 

surgery and reports pt's pain resolved after. He cx'd her disc space and found 

trace amounts of the same bacteria - based on his other findings, he does not 

believe this was a true infection  but pt was started on anbx to cover 

organism. He is appreciative for f/u but does not feel faxed results are 

necessary as they will not change in tx plan. Discussed results on 8/28/2018 at 

18:30.








Re-Evaluation





- Re-Evaluation


  ** First Eval


Re-Evaluation Time: 14:14


Change: Unchanged


Comment: Informed of transfer





Course/Dx





- Course


Course Of Treatment: DISCUSSED WITH THE PATIENT'S NEUROSURGEON, DR MARIN [phone].  HE RECOMMENDED TRANSFER TO [facility] WHERE HE HAS ADMITTING 

PRIVLEDGES.  STABLE AT TRANSFER.





- Diagnoses


Provider Diagnoses: 


 Back pain, Abscess in epidural space of lumbar spine, Lumbar discitis








- Provider Notifications


Time Discussed With Above Provider: 08:35


Instructed by Provider To: Other - Phone number [phone]. Recommends MRI of 

L-S spine.





Discharge





- Sign-Out/Discharge


Documenting (check all that apply): Post-Discharge Follow Up





- Discharge Plan


Condition: Stable


Disposition: TRANS HIGHER LVL OF CARE FAC


Referrals: 


Nazia Posada MD [Primary Care Provider] - 





- Billing Disposition and Condition


Condition: STABLE


Disposition: Trans Higher Lvl of Care Fac